# Patient Record
Sex: MALE | Race: WHITE | Employment: FULL TIME | URBAN - METROPOLITAN AREA
[De-identification: names, ages, dates, MRNs, and addresses within clinical notes are randomized per-mention and may not be internally consistent; named-entity substitution may affect disease eponyms.]

---

## 2018-08-02 PROBLEM — M72.2 PLANTAR FASCIITIS OF RIGHT FOOT: Status: ACTIVE | Noted: 2018-08-02

## 2018-08-02 PROBLEM — R53.83 FATIGUE: Status: ACTIVE | Noted: 2018-08-02

## 2018-08-02 NOTE — PROGRESS NOTES
HPI:  Here for a physical.    PAST MEDICAL HISTORY:  History reviewed. No pertinent past medical history. PAST SURGICAL HISTORY:  History reviewed. No pertinent surgical history. MEDICATIONS:    No current outpatient prescriptions on file.   ALLERGIES: Pa the medial aspect of the bottom of the foot. He has tried icing and stretches. NEURO: No complaints of any new headaches or change in headache pattern. PSYCHE: No complaints of symptoms of depression or anxiety.   HEMATOLOGY: No complaints of bruising or edema.  NEUROLOGIC: CN's II-XII grossly intact, DTR's 2+/4+ throughout. Strength 5+/5+ x 4 ext. Alert and orientated. SKIN: no suspicions lesions noted. PSYCHIATRIC: Mood and affect appropriate. ASSESSMENT / PLAN:  Routine health maintenance.   Lab

## 2018-08-02 NOTE — PATIENT INSTRUCTIONS
Understanding Plantar Fasciitis    Plantar fasciitis is a condition that causes foot and heel pain. The plantar fascia is a tough band of tissue that runs across the bottom of the foot from the heel to the toes. This tissue pulls on the heel bone.  It sup · Using heel cups or foot inserts (orthotics). These are placed in the shoes to help support the heel or arch and cushion the heel. You may also be told to buy proper-fitting shoes with good arch support and cushioned soles. · Taping the foot.  This suppor

## 2019-05-28 PROBLEM — D17.1 LIPOMA OF CHEST WALL: Status: ACTIVE | Noted: 2019-05-28

## 2019-05-28 PROBLEM — R53.83 FATIGUE: Status: RESOLVED | Noted: 2018-08-02 | Resolved: 2019-05-28

## 2019-05-28 NOTE — PROGRESS NOTES
HPI:  Here for a physical.    PAST MEDICAL HISTORY:  History reviewed. No pertinent past medical history. PAST SURGICAL HISTORY:  History reviewed. No pertinent surgical history. MEDICATIONS:    No current outpatient medications on file.   ALLERGIES: Maryanne Diet: No        Stress Concern: No        Weight Concern: No    Social History Narrative      Not on file      REVIEW OF SYSTEMS:  GENERAL: Feeling generally well. EYES: No complaints of diplopia or blurred vision.   EARS: No complaints of tinnitus or decr moist without lesions. Dentition intact and gums appear healthy. NECK: Supple, No lymphadenopathy. No thyromegaly or lesions palpated. CARDIOVASCULAR: RRR, NL S1 and S2, no murmurs. Bilateral carotids without bruit. No abdominal bruits auscultated.  Bilat

## 2020-03-02 NOTE — PROGRESS NOTES
CHIEF COMPLAINT:   Patient presents with:  Nausea/Vomiting/Diarrhea      HPI:   Selene Davenport is a 37year old male who presents for complaints of N/V/D. Symptoms have been present for 1  days. Started last night Stool is loose and frequent.   4-5 bowel mo NOSE: nostrils patent. Nasal mucosa pink and without exudates. THROAT: oral mucosa pink, moist. Posterior pharynx is not erythematous. No exudates. NECK: supple,no adenopathy  LUNGS: clear to auscultation bilaterally. No wheezing, rales, or rhonchi. · If you have severe vomiting, don't drink sport drinks, such as electrolyte solutions. These don't have the right mix of water, sugar, and minerals. They can also make the symptoms worse.  In this situation, commercially available oral rehydration solution · Inability to urinate. In infants and young children, not making wet diapers.   Date Last Reviewed: 8/1/2018  © 0428-3224 [employer]. [facility] [facility] [place] [address] [place] [address]. All rights reserved.  This information is not intended as a subs

## 2021-08-09 PROBLEM — L02.425: Status: ACTIVE | Noted: 2021-08-09

## 2021-08-09 PROBLEM — M72.2 PLANTAR FASCIITIS OF LEFT FOOT: Status: ACTIVE | Noted: 2021-08-09

## 2021-08-09 NOTE — PROGRESS NOTES
HPI:  Here for a physical.    PAST MEDICAL HISTORY:  History reviewed. No pertinent past medical history. PAST SURGICAL HISTORY:  History reviewed. No pertinent surgical history.   MEDICATIONS:  Current Outpatient Medications   Medication Sig Dispense Refi   Attends [employer] Services:       Active Member of Clubs or Organizations:       Attends Club or Organization Meetings:       Marital Status:   Intimate Partner Violence:       Fear of Current or Ex-Partner:       Emotionally Abused:       Physically Abu no apparent distress. EARS: Bilateral pinna / tragus are WNL in appearance, External canals patent and without inflammation. Bilateral tympanic membranes pearly white. No effusions noted.  Hearing grossly normal.  EYES: PERRLA, EOMI, bilateral sclera anict plantar fasciitis does not improve will call or message for referral to physical therapy. Discussed steroid injections by the podiatrist.  Rarely surgery is indicated. Furuncle posterior right thigh: Triamcinolone cream for when this flares up.   I told

## 2021-08-09 NOTE — PATIENT INSTRUCTIONS
Heel and Achilles stretches first 4 to 6 weeks. Frozen water bottle massage 10 to 20 minutes twice a day. If plantar fascia does not seem to improve, call or message me and we can do a referral for physical therapy.

## 2021-09-27 NOTE — TELEPHONE ENCOUNTER
From: Barb Sat  To: Rogers Toure MD  Sent: 9/25/2021 9:00 PM CDT  Subject: Foot not healing    Good evening Dr Scanlon January,    My foot is still not getting better, I have been rolling it twice a day for awhile.  Seeing if you could provide me with some re

## 2021-09-27 NOTE — TELEPHONE ENCOUNTER
Per 8/9/21 OV:   Heel and Achilles stretches first 4 to 6 weeks. Frozen water bottle massage 10 to 20 minutes twice a day. If plantar fascia does not seem to improve, call or message me and we can do a referral for physical therapy.     Pt reports he has

## 2021-11-01 NOTE — PATIENT INSTRUCTIONS
Viral Syndrome (Adult)  A viral illness may cause a number of symptoms such as fever. Other symptoms depend on the part of the body that the virus affects.  If it settles in your nose, throat, and lungs, it may cause cough, sore throat, congestion, runny how much and what types of fluids you should drink to prevent dehydration. If you have kidney disease, drinking too much fluid can cause it build up in the your body and be dangerous to your health.   · Over-the-counter remedies won't shorten the length of Non-viral causes of gastroenteritis include bacteria, parasites, and toxins. The danger from repeated vomiting or diarrhea is dehydration. This is the loss of too much fluid from the body. When this occurs, body fluids must be replaced.  Antibiotics don't fever. It may cause severe liver damage. Don't use NSAIDS is you are already taking one for another condition (like arthritis) or are on aspirin (such as for heart disease or after a stroke).   If medicine for vomiting or diarrhea are prescribed, take these alcohol. During the next 24 hours:  · Gradually resume a normal diet as you feel better and your symptoms improve. · If at any time it starts getting worse again, go back to clear liquids until you feel better.   Follow-up care  Follow up with your health

## 2021-11-01 NOTE — PROGRESS NOTES
CHIEF COMPLAINT:   Patient presents with: Other: throwing up and diarrhea x1 day      HPI:   Benny Perry is a 31year old male who presents for complaints of vomiting/diarrhea. Symptoms have been present for 1  days.   Frequency: few times but has now r adenopathy  LUNGS: clear to auscultation bilaterally. No wheezing, rales, or rhonchi. CARDIO: RRR without murmur  GI: No visible scars or masses. BS's present x4. No palpable masses or hepatosplenomegaly. no tenderness upon palpation.  No rebound tende medicine was prescribed for this. If you have chronic liver or kidney disease or ever had a stomach ulcer or gastrointestinal bleeding, talk with your healthcare provider before using these medicines.  No one who is younger than 25 and ill with a fever shou faint  · Extreme thirst  · Fever of 100.4°F (38°C) or higher, or as directed by your healthcare provider  Osvaldo last reviewed this educational content on 4/1/2018  © 6673-0188 [employer]. All rights reserved.  This information is not inten serve food to others. When preparing foods, wash your hands before and after. · Wash your hands after using cutting boards, countertops, knives, or utensils that have been in contact with raw food. · Dry your hands with a single use towel.   · Keep uncook gelatin, ice pops, and fruit juice bars.   During the next 24 hours (the second day), you may add the following to the above:  · Hot cereal, plain toast, bread, rolls, and crackers  · Plain noodles, rice, mashed potatoes, chicken noodle or rice soup  · Unsw content on 6/1/2018  © 7035-0024 [employer]. All rights reserved. This information is not intended as a substitute for professional medical care. Always follow your healthcare professional's instructions.             The patient indicates und

## 2021-11-08 NOTE — PATIENT INSTRUCTIONS
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  She subsequently completed her Doctor of Physical Therapy

## 2021-11-08 NOTE — PROGRESS NOTES
LOWER EXTREMITY EVALUATION:   Referring Physician: Dr. Joey Geller  Diagnosis: Plantar fasciitis of left foot (M72.2) Date of Service: 11/8/2021     PATIENT SUMMARY   Susanna Quevedo is a 31year old male who presents to therapy today with complaints of constan goals.     Precautions:  None  OBJECTIVE:   Observation: bilateral mild calcaneal eversion; hesitancy to WB over the LLE  Palpation: TTP L plantar fascia- mid & at calcaneal insertion w/ increased tone/ tension noted; STRs bilateral gastroc & soleus    OPAL prolonged walking/ physical activity  · Pt will be independent and compliant with comprehensive HEP to maintain progress achieved in PT    Frequency / Duration: Patient will be seen for 2 x/week or a total of 8 visits over a 90 day period.  Treatment will i

## 2021-11-12 NOTE — PROGRESS NOTES
Dx: Plantar fasciitis of left foot (M72.2)         Insurance (Authorized # of Visits):  [facility] 60 visit limit combined hard max no auth           Authorizing Physician: Dr. Joey Geller  Next MD visit: none scheduled  Fall Risk: standard         Precautions: n/a min  Total Treatment Time: 45 min

## 2021-11-17 NOTE — PROGRESS NOTES
Dx: Plantar fasciitis of left foot (M72.2)         Insurance (Authorized # of Visits):  [facility] 60 visit limit combined hard max no auth           Authorizing Physician: Dr. Gage Anderson  Next MD visit: none scheduled  Fall Risk: standard         Precautions: n/a Total Timed Treatment: 43 min  Total Treatment Time: 43 min

## 2021-11-19 NOTE — PROGRESS NOTES
Dx: Plantar fasciitis of left foot (M72.2)         Insurance (Authorized # of Visits):  [facility] 60 visit limit combined hard max no auth           Authorizing Physician: Dr. Marla Lopez  Next MD visit: none scheduled  Fall Risk: standard         Precautions: n/a 2-way    Active HS stretch w/ Ankle Pump motion 3x30    Clams 3x10    Bridges 3x10    Education- gel heel cups for shoes for increased cushioning w/ WB activity       Manual Therapy  L TC post jt mob gr II-IV MWM DF 12'  STM L plantar fascia 10'  L tib-fem

## 2021-11-23 NOTE — PROGRESS NOTES
Dx: Plantar fasciitis of left foot (M72.2)         Insurance (Authorized # of Visits):  [facility] 60 visit limit combined hard max no auth           Authorizing Physician: Dr. Kirsty Heck  Next MD visit: none scheduled  Fall Risk: standard         Precautions: n/a 3x30\" ea 2-way    Active HS stretch w/ Ankle Pump motion 3x30    Clams 3x10    Bridges 3x10    Education- gel heel cups for shoes for increased cushioning w/ WB activity   Therex  Slantboard stretch 3x30\" ea 2-way    Rockerboard Taps AP & ML for ROM 3' e

## 2021-11-26 NOTE — PROGRESS NOTES
Dx: Plantar fasciitis of left foot (M72.2)         Insurance (Authorized # of Visits):  [facility] 60 visit limit combined hard max no auth           Authorizing Physician: Dr. Jing Marvin  Next MD visit: none scheduled  Fall Risk: standard         Precautions: n/a L4  Slantboard stretch 3x30\" ea 2-way Therex  Slantboard stretch 3x30\" ea 2-way    Active HS stretch w/ Ankle Pump motion 3x30    Clams 3x10    Bridges 3x10    Education- gel heel cups for shoes for increased cushioning w/ WB activity   Therex  Slantboar

## 2021-11-29 NOTE — PROGRESS NOTES
Dx: Plantar fasciitis of left foot (M72.2)         Insurance (Authorized # of Visits):  [facility] 60 visit limit combined hard max no auth           Authorizing Physician: Dr. Teressa Kelly  Next MD visit: none scheduled  Fall Risk: standard         Precautions: n/a wall gastroc & soleus stretch form for HEP; active HS stretch w/ ankle pump  Education- supportive footwear w/ prolonged standing @ home Therex  Recumbent bike 6' L4  Slantboard stretch 3x30\" ea 2-way Therex  Slantboard stretch 3x30\" ea 2-way    Active H Time: 45 min

## 2021-12-03 NOTE — PROGRESS NOTES
Dx: Plantar fasciitis of left foot (M72.2)         Insurance (Authorized # of Visits):  [facility] 60 visit limit combined hard max no auth           Authorizing Physician: Dr. Hari Black  Next MD visit: none scheduled  Fall Risk: standard         Precautions: n/a Discharge to [facility]    Patient/Family/Caregiver was advised of these findings, precautions, and treatment options and has agreed to actively participate in planning and for this course of care.     Thank you for your referral. If you have any questions, please TB ea w/ HHA    ProStretch 3x30\" - discussed possibility of use for HEP Therex    Slantboard stretch 3x30\" ea 2-way    HEP Review/ Update/ Discharge Instructions    Re-assessment strength/ ROM w/ education in findings in relation to POC/ Progress     Man

## 2022-09-20 ENCOUNTER — LAB ENCOUNTER (OUTPATIENT)
Dept: LAB | Age: 37
End: 2022-09-20
Attending: FAMILY MEDICINE

## 2022-09-20 ENCOUNTER — OFFICE VISIT (OUTPATIENT)
Dept: FAMILY MEDICINE CLINIC | Facility: CLINIC | Age: 37
End: 2022-09-20

## 2022-09-20 VITALS
WEIGHT: 203.63 LBS | OXYGEN SATURATION: 97 % | RESPIRATION RATE: 16 BRPM | DIASTOLIC BLOOD PRESSURE: 68 MMHG | SYSTOLIC BLOOD PRESSURE: 122 MMHG | HEIGHT: 70 IN | BODY MASS INDEX: 29.15 KG/M2 | HEART RATE: 65 BPM

## 2022-09-20 DIAGNOSIS — R41.3 MEMORY LOSS: ICD-10-CM

## 2022-09-20 DIAGNOSIS — Z00.00 ROUTINE GENERAL MEDICAL EXAMINATION AT A HEALTH CARE FACILITY: ICD-10-CM

## 2022-09-20 DIAGNOSIS — Z00.00 ROUTINE GENERAL MEDICAL EXAMINATION AT A HEALTH CARE FACILITY: Primary | ICD-10-CM

## 2022-09-20 DIAGNOSIS — R06.83 SNORING: ICD-10-CM

## 2022-09-20 PROBLEM — L02.425: Status: RESOLVED | Noted: 2021-08-09 | Resolved: 2022-09-20

## 2022-09-20 LAB
ALBUMIN SERPL-MCNC: 4.1 G/DL (ref 3.4–5)
ALBUMIN/GLOB SERPL: 1.2 {RATIO} (ref 1–2)
ALP LIVER SERPL-CCNC: 76 U/L
ALT SERPL-CCNC: 44 U/L
ANION GAP SERPL CALC-SCNC: 5 MMOL/L (ref 0–18)
AST SERPL-CCNC: 24 U/L (ref 15–37)
BASOPHILS # BLD AUTO: 0.06 X10(3) UL (ref 0–0.2)
BASOPHILS NFR BLD AUTO: 1 %
BILIRUB SERPL-MCNC: 1 MG/DL (ref 0.1–2)
BUN BLD-MCNC: 16 MG/DL (ref 7–18)
CALCIUM BLD-MCNC: 9.3 MG/DL (ref 8.5–10.1)
CHLORIDE SERPL-SCNC: 107 MMOL/L (ref 98–112)
CHOLEST SERPL-MCNC: 175 MG/DL (ref ?–200)
CO2 SERPL-SCNC: 27 MMOL/L (ref 21–32)
CREAT BLD-MCNC: 1.11 MG/DL
EOSINOPHIL # BLD AUTO: 0.13 X10(3) UL (ref 0–0.7)
EOSINOPHIL NFR BLD AUTO: 2.2 %
ERYTHROCYTE [DISTWIDTH] IN BLOOD BY AUTOMATED COUNT: 13 %
FASTING PATIENT LIPID ANSWER: NO
FASTING STATUS PATIENT QL REPORTED: NO
GFR SERPLBLD BASED ON 1.73 SQ M-ARVRAT: 89 ML/MIN/1.73M2 (ref 60–?)
GLOBULIN PLAS-MCNC: 3.4 G/DL (ref 2.8–4.4)
GLUCOSE BLD-MCNC: 94 MG/DL (ref 70–99)
HCT VFR BLD AUTO: 46 %
HDLC SERPL-MCNC: 43 MG/DL (ref 40–59)
HGB BLD-MCNC: 15.1 G/DL
IMM GRANULOCYTES # BLD AUTO: 0.02 X10(3) UL (ref 0–1)
IMM GRANULOCYTES NFR BLD: 0.3 %
LDLC SERPL CALC-MCNC: 98 MG/DL (ref ?–100)
LYMPHOCYTES # BLD AUTO: 2.25 X10(3) UL (ref 1–4)
LYMPHOCYTES NFR BLD AUTO: 37.4 %
MCH RBC QN AUTO: 28.8 PG (ref 26–34)
MCHC RBC AUTO-ENTMCNC: 32.8 G/DL (ref 31–37)
MCV RBC AUTO: 87.8 FL
MONOCYTES # BLD AUTO: 0.64 X10(3) UL (ref 0.1–1)
MONOCYTES NFR BLD AUTO: 10.6 %
NEUTROPHILS # BLD AUTO: 2.92 X10 (3) UL (ref 1.5–7.7)
NEUTROPHILS # BLD AUTO: 2.92 X10(3) UL (ref 1.5–7.7)
NEUTROPHILS NFR BLD AUTO: 48.5 %
NONHDLC SERPL-MCNC: 132 MG/DL (ref ?–130)
OSMOLALITY SERPL CALC.SUM OF ELEC: 289 MOSM/KG (ref 275–295)
PLATELET # BLD AUTO: 212 10(3)UL (ref 150–450)
POTASSIUM SERPL-SCNC: 3.9 MMOL/L (ref 3.5–5.1)
PROT SERPL-MCNC: 7.5 G/DL (ref 6.4–8.2)
RBC # BLD AUTO: 5.24 X10(6)UL
SODIUM SERPL-SCNC: 139 MMOL/L (ref 136–145)
T PALLIDUM AB SER QL IA: NONREACTIVE
T4 FREE SERPL-MCNC: 1 NG/DL (ref 0.8–1.7)
TRIGL SERPL-MCNC: 201 MG/DL (ref 30–149)
TSI SER-ACNC: 1.64 MIU/ML (ref 0.36–3.74)
VLDLC SERPL CALC-MCNC: 33 MG/DL (ref 0–30)
WBC # BLD AUTO: 6 X10(3) UL (ref 4–11)

## 2022-09-20 PROCEDURE — 3078F DIAST BP <80 MM HG: CPT | Performed by: FAMILY MEDICINE

## 2022-09-20 PROCEDURE — 80053 COMPREHEN METABOLIC PANEL: CPT

## 2022-09-20 PROCEDURE — 84443 ASSAY THYROID STIM HORMONE: CPT

## 2022-09-20 PROCEDURE — 99395 PREV VISIT EST AGE 18-39: CPT | Performed by: FAMILY MEDICINE

## 2022-09-20 PROCEDURE — 84439 ASSAY OF FREE THYROXINE: CPT

## 2022-09-20 PROCEDURE — 3074F SYST BP LT 130 MM HG: CPT | Performed by: FAMILY MEDICINE

## 2022-09-20 PROCEDURE — 36415 COLL VENOUS BLD VENIPUNCTURE: CPT

## 2022-09-20 PROCEDURE — 86780 TREPONEMA PALLIDUM: CPT

## 2022-09-20 PROCEDURE — 80061 LIPID PANEL: CPT

## 2022-09-20 PROCEDURE — 85025 COMPLETE CBC W/AUTO DIFF WBC: CPT

## 2022-09-20 PROCEDURE — 3008F BODY MASS INDEX DOCD: CPT | Performed by: FAMILY MEDICINE

## 2022-12-09 ENCOUNTER — OFFICE VISIT (OUTPATIENT)
Dept: FAMILY MEDICINE CLINIC | Facility: CLINIC | Age: 37
End: 2022-12-09
Payer: COMMERCIAL

## 2022-12-09 VITALS
HEART RATE: 79 BPM | OXYGEN SATURATION: 100 % | TEMPERATURE: 97 F | DIASTOLIC BLOOD PRESSURE: 72 MMHG | HEIGHT: 70 IN | SYSTOLIC BLOOD PRESSURE: 124 MMHG | BODY MASS INDEX: 29.35 KG/M2 | RESPIRATION RATE: 18 BRPM | WEIGHT: 205 LBS

## 2022-12-09 DIAGNOSIS — R68.89 FLU-LIKE SYMPTOMS: Primary | ICD-10-CM

## 2022-12-09 PROCEDURE — 3074F SYST BP LT 130 MM HG: CPT | Performed by: FAMILY MEDICINE

## 2022-12-09 PROCEDURE — 3008F BODY MASS INDEX DOCD: CPT | Performed by: FAMILY MEDICINE

## 2022-12-09 PROCEDURE — 99213 OFFICE O/P EST LOW 20 MIN: CPT | Performed by: FAMILY MEDICINE

## 2022-12-09 PROCEDURE — 3078F DIAST BP <80 MM HG: CPT | Performed by: FAMILY MEDICINE

## 2022-12-09 PROCEDURE — 87637 SARSCOV2&INF A&B&RSV AMP PRB: CPT | Performed by: FAMILY MEDICINE

## 2022-12-10 LAB
FLUAV + FLUBV RNA SPEC NAA+PROBE: DETECTED
FLUAV + FLUBV RNA SPEC NAA+PROBE: NOT DETECTED
RSV RNA SPEC NAA+PROBE: NOT DETECTED
SARS-COV-2 RNA RESP QL NAA+PROBE: NOT DETECTED

## 2022-12-10 NOTE — PATIENT INSTRUCTIONS
Your testing will be back in 24-36 hours. Use OTC meds for comfort as needed--  Ibuprofen/Tylenol for fever/pain  Use Benadryl at bedtime to reduce drainage and promote rest.  Zyrtec/Claritin/Allegra in the AM to reduce nasal drainage without sedation. Use saline nasal sprays to reduce congestion and thin secretions. Use Delsym for cough. Consider applying diego's vapo-rub or eucayptus oil to chest and feet at bedtime to reduce chest and nasal congestion. Warm tea with honey, cough lozenges, vaporizers/steam etc.    If no better in 2-3 days, follow-up with your PCP for further evaluation.

## 2023-02-27 ENCOUNTER — OFFICE VISIT (OUTPATIENT)
Dept: SURGERY | Facility: CLINIC | Age: 38
End: 2023-02-27

## 2023-02-27 DIAGNOSIS — Z30.2 ENCOUNTER FOR STERILIZATION: Primary | ICD-10-CM

## 2023-02-27 RX ORDER — DIAZEPAM 10 MG/1
10 TABLET ORAL SEE ADMIN INSTRUCTIONS
Qty: 2 TABLET | Refills: 0 | Status: SHIPPED | OUTPATIENT
Start: 2023-02-27

## 2023-02-27 RX ORDER — TRAMADOL HYDROCHLORIDE 50 MG/1
50 TABLET ORAL EVERY 6 HOURS PRN
Qty: 15 TABLET | Refills: 0 | Status: SHIPPED | OUTPATIENT
Start: 2023-02-27

## 2023-03-01 ENCOUNTER — TELEPHONE (OUTPATIENT)
Dept: SURGERY | Facility: CLINIC | Age: 38
End: 2023-03-01

## 2023-03-02 NOTE — TELEPHONE ENCOUNTER
RN called patient and offered 4/21 Friday at [address] [place] [address]. Instructions given and verbalized understanding. Agreed to plans.

## 2023-04-19 ENCOUNTER — TELEPHONE (OUTPATIENT)
Dept: SURGERY | Facility: CLINIC | Age: 38
End: 2023-04-19

## 2023-04-19 NOTE — TELEPHONE ENCOUNTER
RN called [employer] to obtain Prior auth for natalie vasectomy, CPT 29804, dx Z30.2. Per Libia Singer, no PA needed.  Ref [id number]

## 2023-04-21 ENCOUNTER — PROCEDURE (OUTPATIENT)
Dept: SURGERY | Facility: CLINIC | Age: 38
End: 2023-04-21

## 2023-04-21 VITALS — SYSTOLIC BLOOD PRESSURE: 123 MMHG | DIASTOLIC BLOOD PRESSURE: 77 MMHG | HEART RATE: 54 BPM

## 2023-04-21 DIAGNOSIS — Z30.2 ENCOUNTER FOR STERILIZATION: Primary | ICD-10-CM

## 2023-04-21 PROCEDURE — 3078F DIAST BP <80 MM HG: CPT | Performed by: UROLOGY

## 2023-04-21 PROCEDURE — 3074F SYST BP LT 130 MM HG: CPT | Performed by: UROLOGY

## 2023-04-21 PROCEDURE — 55250 REMOVAL OF SPERM DUCT(S): CPT | Performed by: UROLOGY

## 2023-07-15 ENCOUNTER — LAB ENCOUNTER (OUTPATIENT)
Dept: LAB | Facility: HOSPITAL | Age: 38
End: 2023-07-15
Attending: UROLOGY
Payer: COMMERCIAL

## 2023-07-15 DIAGNOSIS — Z30.2 ENCOUNTER FOR STERILIZATION: ICD-10-CM

## 2023-07-15 PROCEDURE — 89310 SEMEN ANALYSIS W/COUNT: CPT

## 2023-07-17 NOTE — PROGRESS NOTES
José Miguel Green,  I have reviewed your test results. Your semen analysis shows no sperm. You may now resume intercourse without the use of contraception. Please let me know if you have any questions or concerns. Thanks and take care!     Kashmir Cohi MD

## (undated) NOTE — LETTER
Date: 3/2/2020    Patient Name: Selene Davenport          To Whom it may concern: This letter has been written at the patient's request. The above patient was seen at the [facility] for treatment of a medical condition.     This patient should b

## (undated) NOTE — Clinical Note
Dear Yoana Da Silva is with great pleasure that we were able to provide treatment to Shade Arce in the [facility] today. In order to keep you informed on your patient's care I have attached the visit history.  Our providers and staff here at the